# Patient Record
Sex: FEMALE | NOT HISPANIC OR LATINO | Employment: UNEMPLOYED | URBAN - METROPOLITAN AREA
[De-identification: names, ages, dates, MRNs, and addresses within clinical notes are randomized per-mention and may not be internally consistent; named-entity substitution may affect disease eponyms.]

---

## 2023-02-07 ENCOUNTER — OFFICE VISIT (OUTPATIENT)
Dept: DERMATOLOGY | Facility: CLINIC | Age: 23
End: 2023-02-07
Payer: COMMERCIAL

## 2023-02-07 DIAGNOSIS — L70.0 ACNE COMEDONE: Primary | ICD-10-CM

## 2023-02-07 DIAGNOSIS — B36.0 TINEA VERSICOLOR: ICD-10-CM

## 2023-02-07 PROCEDURE — 99999 PR PBB SHADOW E&M-NEW PATIENT-LVL II: CPT | Mod: PBBFAC,,, | Performed by: DERMATOLOGY

## 2023-02-07 PROCEDURE — 99204 PR OFFICE/OUTPT VISIT, NEW, LEVL IV, 45-59 MIN: ICD-10-PCS | Mod: S$GLB,,, | Performed by: DERMATOLOGY

## 2023-02-07 PROCEDURE — 1159F MED LIST DOCD IN RCRD: CPT | Mod: CPTII,S$GLB,, | Performed by: DERMATOLOGY

## 2023-02-07 PROCEDURE — 99999 PR PBB SHADOW E&M-NEW PATIENT-LVL II: ICD-10-PCS | Mod: PBBFAC,,, | Performed by: DERMATOLOGY

## 2023-02-07 PROCEDURE — 1160F RVW MEDS BY RX/DR IN RCRD: CPT | Mod: CPTII,S$GLB,, | Performed by: DERMATOLOGY

## 2023-02-07 PROCEDURE — 99204 OFFICE O/P NEW MOD 45 MIN: CPT | Mod: S$GLB,,, | Performed by: DERMATOLOGY

## 2023-02-07 PROCEDURE — 1159F PR MEDICATION LIST DOCUMENTED IN MEDICAL RECORD: ICD-10-PCS | Mod: CPTII,S$GLB,, | Performed by: DERMATOLOGY

## 2023-02-07 PROCEDURE — 1160F PR REVIEW ALL MEDS BY PRESCRIBER/CLIN PHARMACIST DOCUMENTED: ICD-10-PCS | Mod: CPTII,S$GLB,, | Performed by: DERMATOLOGY

## 2023-02-07 RX ORDER — KETOCONAZOLE 20 MG/G
CREAM TOPICAL
Qty: 60 G | Refills: 3 | Status: SHIPPED | OUTPATIENT
Start: 2023-02-07

## 2023-02-07 RX ORDER — TRETINOIN 0.25 MG/G
CREAM TOPICAL
Qty: 20 G | Refills: 6 | Status: SHIPPED | OUTPATIENT
Start: 2023-02-07 | End: 2024-02-07

## 2023-02-07 RX ORDER — AMMONIUM LACTATE 12 G/100G
LOTION TOPICAL
Qty: 225 G | Refills: 11 | Status: SHIPPED | OUTPATIENT
Start: 2023-02-07

## 2023-02-07 NOTE — PATIENT INSTRUCTIONS

## 2023-02-07 NOTE — PROGRESS NOTES
Subjective:       Patient ID:  Concepcion Trevino is a 22 y.o. female who presents for No chief complaint on file.    History of Present Illness: The patient presents with chief complaint of skin discoloration.  Location: bilateral cheeks on face and breast bone.  Duration: 3 years  Signs/Symptoms: discoloration    Prior treatments: CeraVe foaming cleanser, CeraVe acne cleanser, BGS       Review of Systems   Constitutional:  Negative for fever and chills.   Gastrointestinal:  Negative for nausea and vomiting.   Skin:  Positive for activity-related sunscreen use. Negative for daily sunscreen use and recent sunburn.   Hematologic/Lymphatic: Does not bruise/bleed easily.      Objective:    Physical Exam   Constitutional: She appears well-developed and well-nourished. No distress.   Neurological: She is alert and oriented to person, place, and time. She is not disoriented.   Psychiatric: She has a normal mood and affect.   Skin:   Areas Examined (abnormalities noted in diagram):   Head / Face Inspection Performed  Neck Inspection Performed  RUE Inspected  LUE Inspection Performed  Nails and Digits Inspection Performed                 Diagram Legend     Open and closed comedones      Inflammatory papules and pustules     Assessment / Plan:        Acne comedone  -     tretinoin (RETIN-A) 0.025 % cream; Apply pea-sized amount to entire face at bedtime.  If dryness, use every third night and increase as tolerated to every night.  Dispense: 20 g; Refill: 6  -     c/ comedone en plaque of left cheek, will start above med. Consider increase in retinoid as tolerated in the future.    Tinea versicolor  -     ammonium lactate (LAC-HYDRIN) 12 % lotion; Apply to damp skin after bathing for rash on chest  Dispense: 225 g; Refill: 11  -     ketoconazole (NIZORAL) 2 % cream; AAA bid for rash on chest  Dispense: 60 g; Refill: 3  -     vs. Early CARP. Limited to sternal chest, will start above meds and re-evaluate.              Follow  up in about 3 months (around 5/7/2023).